# Patient Record
Sex: FEMALE | Race: WHITE | Employment: UNEMPLOYED | URBAN - METROPOLITAN AREA
[De-identification: names, ages, dates, MRNs, and addresses within clinical notes are randomized per-mention and may not be internally consistent; named-entity substitution may affect disease eponyms.]

---

## 2024-05-31 ENCOUNTER — OFFICE VISIT (OUTPATIENT)
Age: 64
End: 2024-05-31
Payer: COMMERCIAL

## 2024-05-31 VITALS
SYSTOLIC BLOOD PRESSURE: 134 MMHG | BODY MASS INDEX: 33.27 KG/M2 | OXYGEN SATURATION: 90 % | HEIGHT: 66 IN | WEIGHT: 207 LBS | HEART RATE: 73 BPM | DIASTOLIC BLOOD PRESSURE: 74 MMHG

## 2024-05-31 DIAGNOSIS — J44.9 CHRONIC OBSTRUCTIVE PULMONARY DISEASE, UNSPECIFIED COPD TYPE (HCC): ICD-10-CM

## 2024-05-31 DIAGNOSIS — I48.91 ATRIAL FIBRILLATION, UNSPECIFIED TYPE (HCC): Primary | ICD-10-CM

## 2024-05-31 PROCEDURE — 99204 OFFICE O/P NEW MOD 45 MIN: CPT | Performed by: INTERNAL MEDICINE

## 2024-05-31 PROCEDURE — 93000 ELECTROCARDIOGRAM COMPLETE: CPT | Performed by: INTERNAL MEDICINE

## 2024-05-31 PROCEDURE — 1123F ACP DISCUSS/DSCN MKR DOCD: CPT | Performed by: INTERNAL MEDICINE

## 2024-05-31 RX ORDER — ALBUTEROL SULFATE 2.5 MG/3ML
2.5 SOLUTION RESPIRATORY (INHALATION) EVERY 4 HOURS PRN
COMMUNITY
Start: 2024-04-17

## 2024-05-31 RX ORDER — SERTRALINE HYDROCHLORIDE 100 MG/1
100 TABLET, FILM COATED ORAL 2 TIMES DAILY
COMMUNITY
Start: 2024-03-29

## 2024-05-31 RX ORDER — SIMVASTATIN 40 MG
40 TABLET ORAL
COMMUNITY

## 2024-05-31 RX ORDER — ALBUTEROL SULFATE 90 UG/1
2 AEROSOL, METERED RESPIRATORY (INHALATION) EVERY 4 HOURS PRN
COMMUNITY
Start: 2024-02-07

## 2024-05-31 RX ORDER — TIOTROPIUM BROMIDE 18 UG/1
18 CAPSULE ORAL; RESPIRATORY (INHALATION) DAILY
COMMUNITY
Start: 2023-05-15

## 2024-05-31 RX ORDER — ECHINACEA PURPUREA EXTRACT 125 MG
1 TABLET ORAL DAILY PRN
COMMUNITY

## 2024-05-31 RX ORDER — FLUTICASONE FUROATE AND VILANTEROL TRIFENATATE 200; 25 UG/1; UG/1
1 POWDER RESPIRATORY (INHALATION) DAILY
COMMUNITY

## 2024-05-31 RX ORDER — LORATADINE 10 MG/1
1 TABLET ORAL
COMMUNITY

## 2024-05-31 RX ORDER — APIXABAN 5 MG/1
5 TABLET, FILM COATED ORAL 2 TIMES DAILY
COMMUNITY

## 2024-05-31 RX ORDER — ALPRAZOLAM 0.5 MG/1
0.5 TABLET ORAL EVERY 8 HOURS PRN
COMMUNITY
Start: 2023-12-18

## 2024-05-31 NOTE — PROGRESS NOTES
Mackenzie Lozoya presents today for   Chief Complaint   Patient presents with    New Patient     Est care referred by self due to AFIB/COPD     Palpitations     Fluttering palps with exertion     Shortness of Breath     SOB with exertion and at rest uses 02    Dizziness     Lightheaded     Fatigue     Severe fatigue        Mackenzie Lozoya preferred language for health care discussion is english/other.    Is someone accompanying this pt? yes    Is the patient using any DME equipment during OV? yes    Depression Screening:  Depression: Not on file        Learning Assessment:  Who is the primary learner? Patient    What is the preferred language for health care of the primary learner? ENGLISH    How does the primary learner prefer to learn new concepts? DEMONSTRATION    Answered By patient    Relationship to Learner SELF           Pt currently taking Anticoagulant therapy? Eliquis 5 mg 2x daily     Pt currently taking Antiplatelet therapy ? no      Coordination of Care:  1. Have you been to the ER, urgent care clinic since your last visit? Hospitalized since your last visit? no    2. Have you seen or consulted any other health care providers outside of the [facility] since your last visit? Include any pap smears or colon screening. no    
Mucous membranes are moist.   Eyes:      Extraocular Movements: Extraocular movements intact.      Pupils: Pupils are equal, round, and reactive to light.   Neck:      Vascular: No carotid bruit.   Cardiovascular:      Rate and Rhythm: Normal rate and regular rhythm.      Pulses: Normal pulses.      Heart sounds: No murmur heard.     No friction rub. No gallop.   Pulmonary:      Effort: Pulmonary effort is normal.      Breath sounds: Normal breath sounds. No wheezing or rales.   Abdominal:      Palpations: Abdomen is soft.   Musculoskeletal:      Cervical back: Neck supple.      Right lower leg: No edema.      Left lower leg: No edema.   Skin:     General: Skin is warm and dry.   Neurological:      General: No focal deficit present.      Mental Status: She is alert and oriented to person, place, and time.   Psychiatric:         Mood and Affect: Mood normal.         EKG NSR, normal EKG    Echo 11/2023  CONCLUSIONS    * For the indication of Arrhythmias: atrial fibrillation/SVT/VT, findings   are NORMAL LEFT ATRIAL SIZE.     * Left ventricular chamber size, wall thickness, and systolic function are   normal with no regional wall motion abnormalities with an ejection fraction of   71 % by [employer]cantara's biplane.     * Left ventricular diastolic function: Grade II diastolic dysfunction.     * Right ventricular systolic function is normal. Right ventricular chamber   dimension is enlarged.     * Right ventricular fractional area change is 30 %.     * Unable to estimate pulmonary arterial pressure due to inadequate tricuspid   regurgitant Doppler waveforms.     * No hemodynamically significant valvular disease.     * No mass, shunts, or thrombi.     Comparison    * No significant change since prior study from 12/22/2021.     Impression and Plan:  Mackenzie Lozoya is a 64 y.o. with:    pAFib, [id number] ~4 (new dx, in setting of sepsis/ hypoxic respiratory failure)  cHFpEF, grade 2  HTN  DM2 with HL  Obesity  Signs and symptoms

## 2025-03-12 ENCOUNTER — OFFICE VISIT (OUTPATIENT)
Age: 65
End: 2025-03-12
Payer: MEDICARE

## 2025-03-12 VITALS
HEART RATE: 74 BPM | BODY MASS INDEX: 30.53 KG/M2 | DIASTOLIC BLOOD PRESSURE: 73 MMHG | WEIGHT: 190 LBS | SYSTOLIC BLOOD PRESSURE: 111 MMHG | HEIGHT: 66 IN | OXYGEN SATURATION: 96 %

## 2025-03-12 DIAGNOSIS — I50.32 CHRONIC DIASTOLIC HEART FAILURE (HCC): ICD-10-CM

## 2025-03-12 DIAGNOSIS — I48.91 ATRIAL FIBRILLATION, UNSPECIFIED TYPE (HCC): Primary | ICD-10-CM

## 2025-03-12 PROCEDURE — 1126F AMNT PAIN NOTED NONE PRSNT: CPT | Performed by: INTERNAL MEDICINE

## 2025-03-12 PROCEDURE — 1123F ACP DISCUSS/DSCN MKR DOCD: CPT | Performed by: INTERNAL MEDICINE

## 2025-03-12 PROCEDURE — 99215 OFFICE O/P EST HI 40 MIN: CPT | Performed by: INTERNAL MEDICINE

## 2025-03-12 RX ORDER — ATORVASTATIN CALCIUM 40 MG/1
1 TABLET, FILM COATED ORAL
COMMUNITY
Start: 2025-01-31

## 2025-03-12 RX ORDER — FUROSEMIDE 20 MG/1
20 TABLET ORAL DAILY
COMMUNITY
Start: 2025-01-31

## 2025-03-12 RX ORDER — ROFLUMILAST 500 UG/1
500 TABLET ORAL DAILY
COMMUNITY
Start: 2025-01-31

## 2025-03-12 RX ORDER — AMIODARONE HYDROCHLORIDE 200 MG/1
400 TABLET ORAL DAILY
COMMUNITY

## 2025-03-12 RX ORDER — MULTIVITAMIN,THER AND MINERALS
1 TABLET ORAL DAILY
COMMUNITY

## 2025-03-12 RX ORDER — DAPAGLIFLOZIN 10 MG/1
10 TABLET, FILM COATED ORAL DAILY
COMMUNITY
Start: 2025-01-31

## 2025-03-12 RX ORDER — BUDESONIDE, GLYCOPYRROLATE, AND FORMOTEROL FUMARATE 160; 9; 4.8 UG/1; UG/1; UG/1
AEROSOL, METERED RESPIRATORY (INHALATION)
COMMUNITY

## 2025-03-12 ASSESSMENT — PATIENT HEALTH QUESTIONNAIRE - PHQ9
1. LITTLE INTEREST OR PLEASURE IN DOING THINGS: NOT AT ALL
SUM OF ALL RESPONSES TO PHQ QUESTIONS 1-9: 0
2. FEELING DOWN, DEPRESSED OR HOPELESS: NOT AT ALL
SUM OF ALL RESPONSES TO PHQ QUESTIONS 1-9: 0

## 2025-03-12 NOTE — PROGRESS NOTES
Identified pt with two pt identifiers(name and ). Reviewed record in preparation for visit and have obtained necessary documentation.    Mackenzie Lozoya presents today for   Chief Complaint   Patient presents with    Follow-up     - Return in about 6 months        Pt c/o DIZZINESS, FATIGUE/WEAKNESS,           Mackenzie Lozoya preferred language for health care discussion is english/other.    Personal Protective Equipment:   Personal Protective Equipment was used including: mask-surgical and hands-gloves. Patient was placed on no precaution(s). Patient was not masked.    Precautions:   Patient currently on None  Patient currently roomed with door closed.    Is someone accompanying this pt? Yes(Best friend)    Is the patient using any DME equipment during OV? no    Depression Screening:      3/12/2025     2:27 PM   PHQ-9 Questionaire   Little interest or pleasure in doing things 0   Feeling down, depressed, or hopeless 0   PHQ-9 Total Score 0        Learning Assessment:  No question data found.    Abuse Screening:      3/12/2025     2:00 PM   AMB Abuse Screening   Do you ever feel afraid of your partner? N   Are you in a relationship with someone who physically or mentally threatens you? N   Is it safe for you to go home? Y          Fall Risk      3/12/2025     2:27 PM   Fall Risk   Do you feel unsteady or are you worried about falling?  no   2 or more falls in past year? no   Fall with injury in past year? no         Pt currently taking Anticoagulant /Antiplatelet therapy? no    Coordination of Care:  1. Have you been to the ER, urgent care clinic since your last visit? Hospitalized since your last visit? no    2. Have you seen or consulted any other health care providers outside of the [facility] since your last visit? Include any pap smears or colon screening. no      Please see Red banners under Allergies and Med Rec to remove outside inquires. All correct information has been verified with patient

## 2025-03-12 NOTE — PROGRESS NOTES
Mackenzie Lozoya    Chief Complaint   Patient presents with    Follow-up     - Return in about 6 months        HPI    Mackenzie Lozoya is a 65 y.o. female here for follow up of Afib with RVR.     As you know, this pt had COPD, prior cancer s/p chemo & LLL lobectomy (2018, Dr. Kyle).     She was hospitalized 2023, presented with SOB and heart racing, with pAFib RVR 180s. She was discharged on NOAC and BB. For reasons very unclear she doesn't recall any of this.    She comes today post hospital, after having issues again with Afib 2025. She thought this was the first time she had it. She was cardioverted and dc'd on amio. She really doesn't like the amio though has maintained NSR, she is concerned about her lung disease but also feels its causing diarrhea.    Past Medical History:   Diagnosis Date    A-fib (HCC)     Bronchitis     COPD (chronic obstructive pulmonary disease) (HCC)     Diabetes (HCC)     Type 2       Past Surgical History:   Procedure Laterality Date     SECTION      CT GUIDED CHEST TUBE  10/18/2018    CT GUIDED CHEST TUBE 10/18/2018    CT NEEDLE BIOPSY LUNG PERCUTANEOUS W IMAGING GUIDANCE  10/18/2018    CT NEEDLE BIOPSY LUNG PERCUTANEOUS 10/18/2018    KNEE SURGERY Right     SHOULDER SURGERY Left        Current Outpatient Medications   Medication Sig Dispense Refill    atorvastatin (LIPITOR) 40 MG tablet Take 1 tablet by mouth nightly      dapagliflozin (FARXIGA) 10 MG tablet Take 1 tablet by mouth daily      furosemide (LASIX) 20 MG tablet Take 1 tablet by mouth daily      Roflumilast (DALIRESP) 500 MCG tablet Take 1 tablet by mouth daily      amiodarone (CORDARONE) 200 MG tablet Take 2 tablets by mouth daily      BREZTRI AEROSPHERE 160-9-4.8 MCG/ACT AERO TAKE 2 PUFFS BY MOUTH EVERY 12 HOURS AS DIRECTED      vitamin D3 (CHOLECALCIFEROL) 125 MCG (5000 UT) TABS tablet Take 1 tablet by mouth daily      Multiple Vitamins-Minerals (SUPER THERA LOIS M) TABS Take 1 tablet by mouth daily

## 2025-05-30 ENCOUNTER — OFFICE VISIT (OUTPATIENT)
Age: 65
End: 2025-05-30
Payer: MEDICARE

## 2025-05-30 VITALS
OXYGEN SATURATION: 94 % | BODY MASS INDEX: 31.66 KG/M2 | SYSTOLIC BLOOD PRESSURE: 98 MMHG | DIASTOLIC BLOOD PRESSURE: 59 MMHG | HEIGHT: 66 IN | WEIGHT: 197 LBS | HEART RATE: 80 BPM

## 2025-05-30 DIAGNOSIS — I50.32 CHRONIC DIASTOLIC HEART FAILURE (HCC): ICD-10-CM

## 2025-05-30 DIAGNOSIS — I48.91 ATRIAL FIBRILLATION, UNSPECIFIED TYPE (HCC): Primary | ICD-10-CM

## 2025-05-30 PROCEDURE — 1126F AMNT PAIN NOTED NONE PRSNT: CPT | Performed by: INTERNAL MEDICINE

## 2025-05-30 PROCEDURE — 99214 OFFICE O/P EST MOD 30 MIN: CPT | Performed by: INTERNAL MEDICINE

## 2025-05-30 PROCEDURE — 1123F ACP DISCUSS/DSCN MKR DOCD: CPT | Performed by: INTERNAL MEDICINE

## 2025-05-30 PROCEDURE — 1159F MED LIST DOCD IN RCRD: CPT | Performed by: INTERNAL MEDICINE

## 2025-05-30 ASSESSMENT — PATIENT HEALTH QUESTIONNAIRE - PHQ9
2. FEELING DOWN, DEPRESSED OR HOPELESS: SEVERAL DAYS
SUM OF ALL RESPONSES TO PHQ QUESTIONS 1-9: 2
1. LITTLE INTEREST OR PLEASURE IN DOING THINGS: SEVERAL DAYS
SUM OF ALL RESPONSES TO PHQ QUESTIONS 1-9: 2

## 2025-05-30 NOTE — PROGRESS NOTES
Mackenzie Lozoya    Chief Complaint   Patient presents with    Follow-up     Return in 2 months (on 2025) for follow up with EKG with Dr Leblanc.       HPI    Mackenzie Lozoya is a 65 y.o. female here for follow up of Afib with RVR.     As you know, this pt had COPD, prior cancer s/p chemo & LLL lobectomy (2018, Dr. Kyle).     She was hospitalized 2023, presented with SOB and heart racing, with pAFib RVR 180s. She was discharged on NOAC and BB. For reasons very unclear she doesn't recall any of this.    She comes today post hospital, after having issues again with Afib 2025. She thought this was the first time she had it. She was cardioverted and dc'd on amio. She really doesn't like the amio though has maintained NSR, she is concerned about her lung disease but also feels its causing diarrhea.    Past Medical History:   Diagnosis Date    A-fib (HCC)     Bronchitis     COPD (chronic obstructive pulmonary disease) (HCC)     Diabetes (HCC)     Type 2       Past Surgical History:   Procedure Laterality Date     SECTION      CT GUIDED CHEST TUBE  10/18/2018    CT GUIDED CHEST TUBE 10/18/2018    CT NEEDLE BIOPSY LUNG PERCUTANEOUS W IMAGING GUIDANCE  10/18/2018    CT NEEDLE BIOPSY LUNG PERCUTANEOUS 10/18/2018    KNEE SURGERY Right     SHOULDER SURGERY Left        Current Outpatient Medications   Medication Sig Dispense Refill    amiodarone (CORDARONE) 200 MG tablet Take 2 tablets by mouth daily      atorvastatin (LIPITOR) 40 MG tablet Take 1 tablet by mouth nightly      BREZTRI AEROSPHERE 160-9-4.8 MCG/ACT AERO TAKE 2 PUFFS BY MOUTH EVERY 12 HOURS AS DIRECTED      vitamin D3 (CHOLECALCIFEROL) 125 MCG (5000 UT) TABS tablet Take 1 tablet by mouth daily      dapagliflozin (FARXIGA) 10 MG tablet Take 1 tablet by mouth daily      furosemide (LASIX) 20 MG tablet Take 1 tablet by mouth daily      Multiple Vitamins-Minerals (SUPER THERA LOIS M) TABS Take 1 tablet by mouth daily      Roflumilast (DALIRESP) 500 MCG

## 2025-05-30 NOTE — PROGRESS NOTES
Identified pt with two pt identifiers(name and ). Reviewed record in preparation for visit and have obtained necessary documentation.    Mackenzie FORD Devora presents today for   Chief Complaint   Patient presents with    Follow-up     Return in 2 months (on 2025) for follow up with EKG with Dr Leblanc.       Pt c/o SOB, CHEST PAIN/ PRESSURE            Mackenzie Lozoya preferred language for health care discussion is english/other.    Personal Protective Equipment:   Personal Protective Equipment was used including: mask-surgical and hands-gloves. Patient was placed on no precaution(s). Patient was not masked.    Precautions:   Patient currently on None  Patient currently roomed with door closed.    Is someone accompanying this pt? no    Is the patient using any DME equipment during OV? no    Depression Screenin/30/2025     2:06 PM 3/12/2025     2:27 PM   PHQ-9 Questionaire   Little interest or pleasure in doing things 1 0   Feeling down, depressed, or hopeless 1 0   PHQ-9 Total Score 2 0        Learning Assessment:  Who is the primary learner? Patient    What is the preferred language for health care of the primary learner? ENGLISH    How does the primary learner prefer to learn new concepts? DEMONSTRATION    Answered By patient    Relationship to Learner SELF        Abuse Screenin/30/2025     2:00 PM 3/12/2025     2:00 PM   AMB Abuse Screening   Do you ever feel afraid of your partner? N N   Are you in a relationship with someone who physically or mentally threatens you? N N   Is it safe for you to go home? Y Y          Fall Risk      2025     2:08 PM 3/12/2025     2:27 PM   Fall Risk   Do you feel unsteady or are you worried about falling?  no no   2 or more falls in past year? no no   Fall with injury in past year? yes no         Pt currently taking Anticoagulant /Antiplatelet therapy? Eliquis 5mg    Coordination of Care:  1. Have you been to the ER, urgent care clinic since your last